# Patient Record
Sex: FEMALE | Race: WHITE | NOT HISPANIC OR LATINO | Employment: UNEMPLOYED | URBAN - METROPOLITAN AREA
[De-identification: names, ages, dates, MRNs, and addresses within clinical notes are randomized per-mention and may not be internally consistent; named-entity substitution may affect disease eponyms.]

---

## 2017-07-24 ENCOUNTER — COMMUNICATION - HEALTHEAST (OUTPATIENT)
Dept: FAMILY MEDICINE | Facility: CLINIC | Age: 52
End: 2017-07-24

## 2017-07-25 ENCOUNTER — OFFICE VISIT - HEALTHEAST (OUTPATIENT)
Dept: FAMILY MEDICINE | Facility: CLINIC | Age: 52
End: 2017-07-25

## 2017-07-25 DIAGNOSIS — G43.909 MIGRAINE HEADACHE: ICD-10-CM

## 2017-07-25 DIAGNOSIS — R00.1 BRADYCARDIA: ICD-10-CM

## 2017-07-25 RX ORDER — SUMATRIPTAN 25 MG/1
TABLET, FILM COATED ORAL
Qty: 12 TABLET | Refills: 1 | Status: SHIPPED | OUTPATIENT
Start: 2017-07-25

## 2017-07-25 ASSESSMENT — MIFFLIN-ST. JEOR: SCORE: 1078.35

## 2017-11-29 ENCOUNTER — OFFICE VISIT - HEALTHEAST (OUTPATIENT)
Dept: FAMILY MEDICINE | Facility: CLINIC | Age: 52
End: 2017-11-29

## 2017-11-29 DIAGNOSIS — G43.909 MIGRAINE: ICD-10-CM

## 2017-11-29 DIAGNOSIS — G57.61 MORTON NEUROMA, RIGHT: ICD-10-CM

## 2017-11-29 DIAGNOSIS — Z00.00 HEALTH CARE MAINTENANCE: ICD-10-CM

## 2017-11-29 DIAGNOSIS — B34.9 VIRAL SYNDROME: ICD-10-CM

## 2017-11-29 LAB
CHOLEST SERPL-MCNC: 201 MG/DL
FASTING STATUS PATIENT QL REPORTED: YES
HDLC SERPL-MCNC: 88 MG/DL
LDLC SERPL CALC-MCNC: 104 MG/DL
TRIGL SERPL-MCNC: 45 MG/DL

## 2017-11-29 ASSESSMENT — MIFFLIN-ST. JEOR: SCORE: 1075.37

## 2017-11-30 ENCOUNTER — COMMUNICATION - HEALTHEAST (OUTPATIENT)
Dept: FAMILY MEDICINE | Facility: CLINIC | Age: 52
End: 2017-11-30

## 2017-12-08 ENCOUNTER — RECORDS - HEALTHEAST (OUTPATIENT)
Dept: ADMINISTRATIVE | Facility: OTHER | Age: 52
End: 2017-12-08

## 2020-03-09 ENCOUNTER — ANESTHESIA - HEALTHEAST (OUTPATIENT)
Dept: SURGERY | Facility: HOSPITAL | Age: 55
End: 2020-03-09

## 2020-03-09 ENCOUNTER — SURGERY - HEALTHEAST (OUTPATIENT)
Dept: SURGERY | Facility: HOSPITAL | Age: 55
End: 2020-03-09

## 2020-03-09 ASSESSMENT — MIFFLIN-ST. JEOR: SCORE: 1064.87

## 2020-03-14 ENCOUNTER — AMBULATORY - HEALTHEAST (OUTPATIENT)
Dept: SURGERY | Facility: CLINIC | Age: 55
End: 2020-03-14

## 2020-03-14 DIAGNOSIS — K35.33 ACUTE APPENDICITIS WITH PERFORATION, LOCALIZED PERITONITIS, AND ABSCESS, UNSPECIFIED WHETHER GANGRENE PRESENT: ICD-10-CM

## 2020-03-16 ENCOUNTER — HOSPITAL ENCOUNTER (OUTPATIENT)
Dept: CT IMAGING | Facility: HOSPITAL | Age: 55
Discharge: HOME | End: 2020-03-16
Attending: SURGERY

## 2020-03-16 ENCOUNTER — COMMUNICATION - HEALTHEAST (OUTPATIENT)
Dept: SURGERY | Facility: CLINIC | Age: 55
End: 2020-03-16

## 2020-03-16 DIAGNOSIS — K35.33 ACUTE APPENDICITIS WITH PERFORATION, LOCALIZED PERITONITIS, AND ABSCESS, UNSPECIFIED WHETHER GANGRENE PRESENT: ICD-10-CM

## 2020-03-17 ENCOUNTER — AMBULATORY - HEALTHEAST (OUTPATIENT)
Dept: SURGERY | Facility: CLINIC | Age: 55
End: 2020-03-17

## 2020-03-17 ENCOUNTER — OFFICE VISIT - HEALTHEAST (OUTPATIENT)
Dept: SURGERY | Facility: CLINIC | Age: 55
End: 2020-03-17

## 2020-03-17 DIAGNOSIS — Z48.89 POSTOPERATIVE VISIT: ICD-10-CM

## 2020-03-17 DIAGNOSIS — K35.80 ACUTE APPENDICITIS, UNSPECIFIED ACUTE APPENDICITIS TYPE: ICD-10-CM

## 2020-03-17 DIAGNOSIS — R19.7 DIARRHEA OF PRESUMED INFECTIOUS ORIGIN: ICD-10-CM

## 2020-03-17 RX ORDER — METRONIDAZOLE 500 MG/1
500 TABLET ORAL 3 TIMES DAILY
Qty: 21 TABLET | Refills: 0 | Status: SHIPPED | OUTPATIENT
Start: 2020-03-17

## 2020-06-15 ENCOUNTER — RECORDS - HEALTHEAST (OUTPATIENT)
Dept: ADMINISTRATIVE | Facility: OTHER | Age: 55
End: 2020-06-15

## 2020-06-29 ENCOUNTER — RECORDS - HEALTHEAST (OUTPATIENT)
Dept: BONE DENSITY | Facility: CLINIC | Age: 55
End: 2020-06-29

## 2020-06-29 ENCOUNTER — COMMUNICATION - HEALTHEAST (OUTPATIENT)
Dept: TELEHEALTH | Facility: CLINIC | Age: 55
End: 2020-06-29

## 2020-06-29 ENCOUNTER — RECORDS - HEALTHEAST (OUTPATIENT)
Dept: ADMINISTRATIVE | Facility: OTHER | Age: 55
End: 2020-06-29

## 2020-06-29 DIAGNOSIS — Z82.69 FAMILY HISTORY OF OTHER DISEASES OF THE MUSCULOSKELETAL SYSTEM AND CONNECTIVE TISSUE: ICD-10-CM

## 2020-06-29 DIAGNOSIS — R63.6 UNDERWEIGHT: ICD-10-CM

## 2020-06-29 DIAGNOSIS — N95.1 MENOPAUSAL AND FEMALE CLIMACTERIC STATES: ICD-10-CM

## 2021-05-31 VITALS — HEIGHT: 65 IN | WEIGHT: 107.5 LBS | BODY MASS INDEX: 17.91 KG/M2

## 2021-05-31 VITALS — HEIGHT: 65 IN | WEIGHT: 108.8 LBS | BODY MASS INDEX: 18.13 KG/M2

## 2021-06-04 VITALS — HEIGHT: 65 IN | BODY MASS INDEX: 17.78 KG/M2 | WEIGHT: 106.7 LBS

## 2021-06-06 NOTE — ANESTHESIA CARE TRANSFER NOTE
Last vitals:   Vitals:    03/09/20 2051   BP: 123/65   Pulse: 62   Resp: 16   Temp: (!) 38.2  C (100.8  F)   SpO2: 98%     Patient's level of consciousness is drowsy  Spontaneous respirations: yes  Maintains airway independently: yes  Dentition unchanged: yes  Oropharynx: oropharynx clear of all foreign objects    QCDR Measures:  ASA# 20 - Surgical Safety Checklist: WHO surgical safety checklist completed prior to induction    PQRS# 430 - Adult PONV Prevention: 4558F - Pt received => 2 anti-emetic agents (different classes) preop & intraop  ASA# 8 - Peds PONV Prevention: NA - Not pediatric patient, not GA or 2 or more risk factors NOT present  PQRS# 424 - Corinna-op Temp Management: 4559F - At least one body temp DOCUMENTED => 35.5C or 95.9F within required timeframe  PQRS# 426 - PACU Transfer Protocol: - Transfer of care checklist used  ASA# 14 - Acute Post-op Pain: ASA14B - Patient did NOT experience pain >= 7 out of 10

## 2021-06-06 NOTE — ANESTHESIA PREPROCEDURE EVALUATION
Anesthesia Evaluation      No history of anesthetic complications     Airway   Mallampati: I  Neck ROM: full   Pulmonary - negative ROS    breath sounds clear to auscultation                         Cardiovascular   Exercise tolerance: > or = 4 METS  Rhythm: regular  Rate: normal,         Neuro/Psych - negative ROS     Endo/Other - negative ROS      GI/Hepatic/Renal      Comments: Acute appendicitis w/ severe associated pain, s/f lap appy          Dental - normal exam                        Anesthesia Plan  Planned anesthetic: general endotracheal  GETA - RSI  Fentanyl 50mcg IV x2 in preop for severe pain  Decadron 10, Zofran 4, propofol gtt for antiemesis  Toradol at case closure if cleared with surgeon  ASA 1 - emergent   Induction: intravenous   Anesthetic plan and risks discussed with: patient and spouse  Anesthesia plan special considerations: rapid sequence induction, antiemetics,   Post-op plan: routine recovery

## 2021-06-06 NOTE — PROGRESS NOTES
"Ava Wick is a 55 y.o. female who is being evaluated via a billable telephone visit.      The patient has been notified of following:     \"This telephone visit will be conducted via a call between you and your physician/provider. We have found that certain health care needs can be provided without the need for a physical exam.  This service lets us provide the care you need with a short phone conversation.  If a prescription is necessary we can send it directly to your pharmacy.  If lab work is needed we can place an order for that and you can then stop by our lab to have the test done at a later time.    If during the course of the call the physician/provider feels a telephone visit is not appropriate, you will not be charged for this service.\"         Ava Wick is status post laparoscopic appendectomy 8 days ago.  She had a ruptured appendix with fairly significant peritonitis.  Over the weekend she developed a fever and a CT scan was done at an outside facility which apparently showed a couple lesions in the liver that they were worried for abscess.  She had a repeat CT scan done yesterday which already shows improvement in these areas.  She has had no fever since the fever on Saturday.  She has been having some diarrhea and describes it is fairly watery but very low volume.  She denies any significant abdominal pain.  Just some discomfort in the right lower quadrant.  She states her incisions look good.  There is no redness or swelling.  She was having some abdominal bloating over the weekend but that has since gone down fairly significantly.  She was told at the outside facility to stay on a clear liquid diet.    I have reviewed and updated the patient's Past Medical History, Social History, Family History and Medication List.    ALLERGIES  Patient has no known allergies.            Assessment/Plan:  Status post laparoscopic appendectomy for ruptured appendicitis.  Still showing some signs of " infection.  I am a little concerned about C. difficile colitis but not significantly.  Because of the liver lesions I think she needs to stay on antibiotics and because of the diarrhea I am going to switch her then from Augmentin to a combination of Levaquin and Flagyl.  If she has a fever or just is not feeling well or her diarrhea has not improved by 1 week from now then I do need to see her in the office.  If she does have a significant fever I want her to call and we would repeat the CT scan but at this point do not plan to repeat it.    I have reviewed the note as documented above.  This accurately captures the substance of my conversation with the patient.      Phone call contact time    Call Started at: 8:30 AM  Call Ended at: 8:45 AM      Signature:  Svetlana Calvin

## 2021-06-06 NOTE — ANESTHESIA POSTPROCEDURE EVALUATION
Patient: Ava Wick  Procedure(s):  APPENDECTOMY, LAPAROSCOPIC  Anesthesia type: general    Patient location: PACU  Last vitals:   Vitals Value Taken Time   BP 99/58 3/9/2020  9:12 PM   Temp 38.2  C (100.8  F) 3/9/2020  8:51 PM   Pulse 62 3/9/2020  9:15 PM   Resp 21 3/9/2020  9:15 PM   SpO2 91 % 3/9/2020  9:15 PM   Vitals shown include unvalidated device data.  Post vital signs: stable  Level of consciousness: awake and responds to simple questions  Post-anesthesia pain: pain controlled  Post-anesthesia nausea and vomiting: no  Pulmonary: nasal cannula  Cardiovascular: stable and blood pressure at baseline  Hydration: adequate  Anesthetic events: no    QCDR Measures:  ASA# 11 - [place]inna-op Cardiac Arrest: ASA11B - Patient did NOT experience unanticipated cardiac arrest  ASA# 12 - [place]-op Mortality Rate: ASA12B - Patient did NOT die  ASA# 13 - PACU Re-Intubation Rate: ASA13B - Patient did NOT require a new airway mgmt  ASA# 10 - Composite Anes Safety: ASA10A - No serious adverse event    Additional Notes:

## 2021-06-12 NOTE — PROGRESS NOTES
ASSESSMENT & PLAN:    1. Migraine headache  No change in frequency, severity, character of migraines.  No red flags.  She is basically here for a med check because her medications are , and also wondering if she needs to do anything different.  Recommend she continue with current management, as her sumatriptan 25 mg works well for her, migraines are infrequent with 3-4 per year, and she has had no changes or worrisome symptoms.  Discussed signs or symptoms for which to be reevaluated, including increased frequency, severity, change in migraines.  Also discussed signs or symptoms for which go to the emergency room including neurologic symptoms.  - SUMAtriptan (IMITREX) 25 MG tablet; Take one tablet by mouth every two hours as needed for migraine max of 8 tabs per 24 hours  Dispense: 12 tablet; Refill: 1    2.  Bradycardia  Asymptomatic.  She runs regularly for exercise.  Discussed signs or symptoms for which to be reevaluated.     3.  Occasional orthostatic symptoms  Long-standing for many years.  Admits she does not likely drink adequate fluids.  Encouraged adequate fluids, avoid abrupt position changes, potentially liberalize salt intake.  If worsening or more frequent symptoms, or if new symptoms, recommend reevaluation.    Patient sees her gynecologist annually for breast and pelvic exam, is up-to-date on mammogram and colonoscopy.  She could consider scheduling an annual physical here for fasting lipids and glucose in review of general screening.    Patient Instructions   If you do not have Imitrex with you when you develop a migraine, you can take two Aleve or three ibuprofen instead.       No orders of the defined types were placed in this encounter.    Medications Discontinued During This Encounter   Medication Reason     predniSONE (DELTASONE) 20 MG tablet Therapy completed     SUMAtriptan (IMITREX) 25 MG tablet Reorder       No Follow-up on file.    CHIEF COMPLAINT:  Chief Complaint   Patient  presents with     Migraine     migraines from Thursday night through weekend; migraines come and go--needs new medication       HISTORY OF PRESENT ILLNESS:  Ava is a 52 y.o. female presenting to the clinic today with complaints of migraines.     Migraines: She has been seen for migraines in the past and was prescribed Imitrex. As long as she takes the medication right at the onset of symptoms, it seems to work well. She tends to get three or four migraines per year. This year, she recalls having a migraine on  and then another one just this past weekend. There have been other occasions that she took the medication and the migraine did not progress. She woke up with the pain in the middle of the night when she got her last migraine. She did not have her Imitrex with her at that time, so she tried taking Advil, but that did not help at all. She did not end up taking the Imitrex until a while later, so she thinks it was too late to be effective. She had a CT of her head in  and inquires if she needs to have more imaging done. She notes that some of her family members also dealt with migraines, but they improved after menopause. She has developed nausea with her migraines, but she denies any visual changes. She thinks stress is a trigger for her migraines. Some of her medication is , so she would like a refill.     REVIEW OF SYSTEMS:   She has not had a period for a year and seven months. Her heart rate tends to run somewhat slow. She occasionally becomes dizzy with positional changes, and this has been more noticeable in the past few years. She is able to jog without light headedness, chest pain, shortness of breath, fatigue.  Denies fatigue or poor energy.  She develops swelling in her fingers if she eats a lot of salt. All other systems are negative.     PFSH:  She is in the process of building a cabin. She does not smoke. She jogs for exercise. She admits she should drink more water.     TOBACCO  "USE:  History   Smoking Status     Never Smoker   Smokeless Tobacco     Not on file       VITALS:  Vitals:    07/25/17 0812   BP: 102/76   Patient Site: Left Arm   Patient Position: Sitting   Cuff Size: Adult Regular   Pulse: (!) 48   Resp: 16   Temp: 97.9  F (36.6  C)   TempSrc: Oral   Weight: 108 lb 12.8 oz (49.4 kg)   Height: 5' 5.25\" (1.657 m)     Wt Readings from Last 3 Encounters:   07/25/17 108 lb 12.8 oz (49.4 kg)   09/29/15 110 lb (49.9 kg)   04/16/15 111 lb 12 oz (50.7 kg)       PHYSICAL EXAM:  GENERAL: Pleasant, well-appearing patient in no acute distress.  HEENT: Pupils equal round reactive to light.  Sclerae and conjunctivae clear. Oropharynx is clear with  moist mucous membranes.  NECK: Supple without lymphadenopathy, no carotid bruits   CARDIOVASCULAR: Heart regular rhythm, bradycardia, without murmur normal S1-S2   LUNGS: Clear to auscultation bilaterally, good air movement throughout   EXTREMITIES Warm and well-perfused without edema. Pedal pulses palpable and symmetric bilaterally  NEURO: Alert and oriented. Grossly nonfocal.  Normal speech and gait.  PSYCHIATRIC: Presents on time and well groomed.  Normal speech and thought content.  Full affect.  No abnormal movements or behaviors noted.     DATA REVIEWED:   ADDITIONAL HISTORY SUMMARIZED (2): Reviewed 2013 Dr. Portillo note regarding migraines  DECISION TO OBTAIN EXTRA INFORMATION (1): None.   RADIOLOGY TESTS SUMMARIZED OR ORDERED (1): Reviewed 2013 CT  LABS REVIEWED OR ORDERED (1): None.  MEDICINE TESTS SUMMARIZED OR ORDERED (1): None.  INDEPENDENT REVIEW OF EKG OR X-RAY (2 each): None.     The visit lasted a total of 20 minutes face to face with the patient. Over 50% of the time was spent counseling and educating the patient about her migraines.    IAlvarez, am scribing for and in the presence of, Dr. Philip.    I, Dr. Philip, personally performed the services described in this documentation, as scribed by Alvarez Barlow in my presence, " and it is both accurate and complete.    MEDICATIONS:  Current Outpatient Prescriptions   Medication Sig Dispense Refill     CALCIUM ORAL Take by mouth.       multivitamin with minerals (THERA-M) 9 mg iron-400 mcg Tab tablet Take 1 tablet by mouth daily.       SUMAtriptan (IMITREX) 25 MG tablet Take one tablet by mouth every two hours as needed for migraine max of 8 tabs per 24 hours 12 tablet 1     No current facility-administered medications for this visit.        Total Data Points: 3

## 2021-06-14 NOTE — PROGRESS NOTES
ASSESSMENT & PLAN:  1. Dodson neuroma, right  Patients with a possible Dodson's neuroma on her right foot.  Discussed the use of a metatarsal pad to help with the pain.  Placed referral for her to see ortho foot - Dr. Etienne.  Also recommended she see Dr. Villegas to help her with running  - Ambulatory referral to Orthopedics    2. Migraine  Patient with a history of migraines.  She has 1-2 a months.  Sumatriptan helps with her headaches will refill medicine.  If frequency increases she will follow-up and will discuss prophylaxis    3. Health care maintenance  Immunizations reviewed and updated .  Alcohol use, safety and moderation discussed.  Recommended adequate calcium intake/osteoporosis prevention.  Discussed colon cancer screening at age 50, 45 if -American.  Diet and exercise reviewed, including goal of aerobic exercise 30-90 minutes most days of the week, moderation of portions sizes, avoiding eating out and fast food and increase in fruits and vegetables.  Discussed safe sex practices.  Discussed sun protection.  Discussed weight management.    - Influenza, Seasonal,Quad Inj, 36+ MOS  - Tdap vaccine,  6yo or older,  IM  - Lipid Cascade  - Basic Metabolic Panel  - HM2(CBC w/o Differential)    4. Viral syndrome  Patient sore throat is due to a post viral syndrome.  Recommend gargling with salt water      Orders Placed This Encounter   Procedures     Influenza, Seasonal,Quad Inj, 36+ MOS     Tdap vaccine,  6yo or older,  IM     Lipid Cascade     Order Specific Question:   Fasting is required?     Answer:   Yes     Basic Metabolic Panel     HM2(CBC w/o Differential)     Ambulatory referral to Orthopedics     Referral Priority:   Routine     Referral Type:   Consultation     Referral Reason:   Evaluation and Treatment     Referral Location:   [facility]     Requested Specialty:   Orthopedics     Number of Visits Requested:   1     There are no discontinued medications.    No  "Follow-up on file.    I have had an Advance Directives discussion with the patient.    CHIEF COMPLAINT:  Chief Complaint   Patient presents with     [facility]     Migraine     Foot Problem     foot injury-seen 04/17/15-had xrays- tore ligiments-referral requested       HISTORY OF PRESENT ILLNESS:  Ava is a 52 y.o. female presenting to the clinic today for a physical examination. She has been seen by Dr. Herrera for [facility] but did not follow her to new practice.     Migraines: She has a history of intermittent migraines. She notes a relationship with dehydration, lack of sleep and alcohol. She admits to nausea during migraines. She needs a refill of sumatriptan which worked well in the past. She only drinks one glass of wine occasionally, but notes a headache after drinking more than one glass. She has migraines every couple of months.      Ankle pain: She was seen on 4/17/15 for a ligament strain. She was seen by PT which helped relieve pain. She continues to have tingling in her foot and pain on the ball of her foot. She runs 6 miles a day. She notes the feeling of a ball between the 2nd and 3rd toe that she noticed last summer. She has pain when stepping out of bed and while running. She does not wear high heels.       REVIEW OF SYSTEMS:   She developed a dry sore throat that she describes as \"raw\". All other systems are negative.    PFSH:  Social History:  The patient reports that there is not domestic violence in her life.     Regular Exercise: yes  Sunscreen Use: yes  Healthy Diet: yes  Dental Visits Regularly: yes  Sexually active: yes  Colonoscopy: yes  Prevention of Osteoporosis: yes   Last DXA: not applicable    Social History     Social History     Marital status:      Spouse name: N/A     Number of children: N/A     Years of education: N/A     Occupational History     Not on file.     Social History Main Topics     Smoking status: Never Smoker     Smokeless tobacco: Not on " "file     Alcohol use Not on file     Drug use: Not on file     Sexual activity: Not on file     Other Topics Concern     Not on file     Social History Narrative       History   Smoking Status     Never Smoker   Smokeless Tobacco     Not on file       Family History:  Family History   Problem Relation Age of Onset     Breast cancer Mother        Past Medical History:  Active Ambulatory Problems     Diagnosis Date Noted     Migraine Headache      Dermatitis Due To Contact With Poison Ivy      Resolved Ambulatory Problems     Diagnosis Date Noted     No Resolved Ambulatory Problems     No Additional Past Medical History       No Known Allergies    Immunization History   Administered Date(s) Administered     Influenza, seasonal,quad inj 36+ mos 2017     Tdap 2009, 2017     Immunization status: up to date and documented, due today    Gynecologic History:  No LMP recorded. Patient is postmenopausal.  Last Pap: N/A. Results were: normal  Last mammogram: 16. Results were: normal    OB History:  OB History      Para Term  AB Living    2 2 2       SAB TAB Ectopic Multiple Live Births                  Surgical History:  Past Surgical History:   Procedure Laterality Date     HI CONIZATION CERVIX,LOOP ELECTRD      Description: Cervical Conization Loop Electrode Excision;  Recorded: 2013;       VITALS:  Vitals:    17 0911   BP: 110/60   Patient Site: Right Arm   Patient Position: Sitting   Cuff Size: Adult Regular   Pulse: (!) 46   Resp: 16   Temp: 98.2  F (36.8  C)   TempSrc: Oral   Weight: 107 lb 8 oz (48.8 kg)   Height: 5' 5.43\" (1.662 m)     Wt Readings from Last 3 Encounters:   17 107 lb 8 oz (48.8 kg)   17 108 lb 12.8 oz (49.4 kg)   09/29/15 110 lb (49.9 kg)     Body mass index is 17.65 kg/(m^2).      PHYSICAL EXAM:  General Appearance: Reveals an alert, pleasant female.   Vitals:  Per nursing notes.  Lungs: Clear to auscultation bilaterally, respirations " unlabored, no wheezing or rales   Heart: Regular rate and rhythm, S1 and S2 normal, no murmur, rub, or gallop, no carotid bruits  Extremities: slight soft mass lateral to second metatarsal . Bunions on both feet.   Neurologic: Normal   Psych: Normal affect. Does not appear anxious or depressed.     DATA REVIEWED:  Additional history summarized (from old records or history from someone other than the patient or another healthcare provider) (2 TOTAL): Reviewed note from 12/2/13 regarding office visit. Reviewed note from 12/16/13 regarding headaches.    Decision to obtain extra information (old records requested or history from another person or accessing Care Everywhere) (1 TOTAL): None.     Radiology tests summarized or ordered (XRAY/CT/MRI/DXA) (1 TOTAL): None.    Labs reviewed or ordered (1 TOTAL): reviewed and ordered labs.     Medicine tests summarized or ordered (EKG/ECHO) (1 TOTAL): None.    Independent review of EKG or X-Ray (2 EACH): None.       The visit lasted a total of 23 minutes face to face with the patient. Over 50% of the time was spent counseling and educating the patient on health maintenance and coordination of care.    I, Meli Adler, am scribing for and in the presence of, Dr. Portillo.    I, Claudette Portillo MD, personally performed the services described in this documentation, as scribed by Meli Adler in my presence, and it is both accurate and complete.    MEDICATIONS:  Current Outpatient Prescriptions   Medication Sig Dispense Refill     CALCIUM ORAL Take by mouth.       multivitamin with minerals (THERA-M) 9 mg iron-400 mcg Tab tablet Take 1 tablet by mouth daily.       SUMAtriptan (IMITREX) 25 MG tablet Take one tablet by mouth every two hours as needed for migraine max of 8 tabs per 24 hours 12 tablet 1     No current facility-administered medications for this visit.        Total Data Points: 3

## 2021-06-16 PROBLEM — K35.32 RUPTURED APPENDICITIS: Status: ACTIVE | Noted: 2020-03-10

## 2021-06-16 PROBLEM — K35.80 ACUTE APPENDICITIS: Status: ACTIVE | Noted: 2020-03-09

## 2021-06-16 PROBLEM — K35.80 ACUTE APPENDICITIS, UNSPECIFIED ACUTE APPENDICITIS TYPE: Status: ACTIVE | Noted: 2020-03-09

## 2021-12-17 NOTE — TELEPHONE ENCOUNTER
Pt calling Dr. Calvin back. She is s/p laparoscopic appendectomy with perforated appendix on 3/9/20 by Dr. Calvin. She is scheduled for a post-op appointment tomorrow, however per Dr. Calvin, her recent CT is improved and she wanted to discuss pt's progress over the phone, if possible rather than come into clinic tomorrow if feeling better.     Pt left message that she is having pain in lower right abdomen, mostly as night. She also reports that she was instructed to be on a liquid diet for 4-5 days per the physician in the ED on 3/13/20 when she went in for lower abdominal pain (see Care Everywhere in [facility])   Dupixent Pregnancy And Lactation Text: This medication likely crosses the placenta but the risk for the fetus is uncertain. This medication is excreted in breast milk.

## 2022-12-12 ENCOUNTER — ANCILLARY PROCEDURE (OUTPATIENT)
Dept: BONE DENSITY | Facility: CLINIC | Age: 57
End: 2022-12-12
Attending: OBSTETRICS & GYNECOLOGY
Payer: COMMERCIAL

## 2022-12-12 DIAGNOSIS — N95.1 MENOPAUSAL SYNDROME: ICD-10-CM

## 2022-12-12 PROCEDURE — 77080 DXA BONE DENSITY AXIAL: CPT | Mod: TC | Performed by: RADIOLOGY

## 2024-12-03 ENCOUNTER — PATIENT OUTREACH (OUTPATIENT)
Dept: CARE COORDINATION | Facility: CLINIC | Age: 59
End: 2024-12-03
Payer: COMMERCIAL